# Patient Record
Sex: FEMALE | Race: WHITE | NOT HISPANIC OR LATINO | Employment: UNEMPLOYED | URBAN - METROPOLITAN AREA
[De-identification: names, ages, dates, MRNs, and addresses within clinical notes are randomized per-mention and may not be internally consistent; named-entity substitution may affect disease eponyms.]

---

## 2024-06-01 ENCOUNTER — HOSPITAL ENCOUNTER (EMERGENCY)
Facility: HOSPITAL | Age: 34
Discharge: HOME | End: 2024-06-01
Payer: COMMERCIAL

## 2024-06-01 ENCOUNTER — APPOINTMENT (OUTPATIENT)
Dept: RADIOLOGY | Facility: HOSPITAL | Age: 34
End: 2024-06-01
Payer: COMMERCIAL

## 2024-06-01 VITALS
BODY MASS INDEX: 36.88 KG/M2 | OXYGEN SATURATION: 100 % | RESPIRATION RATE: 20 BRPM | DIASTOLIC BLOOD PRESSURE: 81 MMHG | HEIGHT: 67 IN | TEMPERATURE: 97.3 F | SYSTOLIC BLOOD PRESSURE: 162 MMHG | WEIGHT: 235 LBS | HEART RATE: 76 BPM

## 2024-06-01 DIAGNOSIS — S68.119A FINGERTIP AMPUTATION, INITIAL ENCOUNTER: Primary | ICD-10-CM

## 2024-06-01 PROCEDURE — 73140 X-RAY EXAM OF FINGER(S): CPT | Mod: LEFT SIDE | Performed by: RADIOLOGY

## 2024-06-01 PROCEDURE — 90471 IMMUNIZATION ADMIN: CPT

## 2024-06-01 PROCEDURE — 99284 EMERGENCY DEPT VISIT MOD MDM: CPT | Mod: 25

## 2024-06-01 PROCEDURE — 73140 X-RAY EXAM OF FINGER(S): CPT | Mod: LT

## 2024-06-01 PROCEDURE — 2500000004 HC RX 250 GENERAL PHARMACY W/ HCPCS (ALT 636 FOR OP/ED): Mod: JZ

## 2024-06-01 PROCEDURE — 90715 TDAP VACCINE 7 YRS/> IM: CPT

## 2024-06-01 PROCEDURE — 96365 THER/PROPH/DIAG IV INF INIT: CPT

## 2024-06-01 PROCEDURE — 2500000005 HC RX 250 GENERAL PHARMACY W/O HCPCS

## 2024-06-01 RX ORDER — CEPHALEXIN 500 MG/1
500 CAPSULE ORAL 4 TIMES DAILY
Qty: 40 CAPSULE | Refills: 0 | Status: SHIPPED | OUTPATIENT
Start: 2024-06-01 | End: 2024-06-11

## 2024-06-01 RX ORDER — LIDOCAINE HYDROCHLORIDE 10 MG/ML
5 INJECTION INFILTRATION; PERINEURAL ONCE
Status: COMPLETED | OUTPATIENT
Start: 2024-06-01 | End: 2024-06-01

## 2024-06-01 RX ORDER — CEFAZOLIN SODIUM 2 G/100ML
2 INJECTION, SOLUTION INTRAVENOUS ONCE
Status: COMPLETED | OUTPATIENT
Start: 2024-06-01 | End: 2024-06-01

## 2024-06-01 RX ADMIN — LIDOCAINE HYDROCHLORIDE 50 MG: 10 INJECTION, SOLUTION INFILTRATION; PERINEURAL at 12:25

## 2024-06-01 RX ADMIN — CEFAZOLIN SODIUM 2 G: 2 INJECTION, SOLUTION INTRAVENOUS at 12:25

## 2024-06-01 RX ADMIN — TETANUS TOXOID, REDUCED DIPHTHERIA TOXOID AND ACELLULAR PERTUSSIS VACCINE, ADSORBED 0.5 ML: 5; 2.5; 8; 8; 2.5 SUSPENSION INTRAMUSCULAR at 14:35

## 2024-06-01 ASSESSMENT — PAIN - FUNCTIONAL ASSESSMENT: PAIN_FUNCTIONAL_ASSESSMENT: 0-10

## 2024-06-01 ASSESSMENT — COLUMBIA-SUICIDE SEVERITY RATING SCALE - C-SSRS
6. HAVE YOU EVER DONE ANYTHING, STARTED TO DO ANYTHING, OR PREPARED TO DO ANYTHING TO END YOUR LIFE?: NO
2. HAVE YOU ACTUALLY HAD ANY THOUGHTS OF KILLING YOURSELF?: NO
1. IN THE PAST MONTH, HAVE YOU WISHED YOU WERE DEAD OR WISHED YOU COULD GO TO SLEEP AND NOT WAKE UP?: NO

## 2024-06-01 ASSESSMENT — PAIN DESCRIPTION - ORIENTATION: ORIENTATION: LEFT

## 2024-06-01 ASSESSMENT — PAIN DESCRIPTION - DESCRIPTORS: DESCRIPTORS: SHARP

## 2024-06-01 ASSESSMENT — PAIN DESCRIPTION - PAIN TYPE: TYPE: ACUTE PAIN

## 2024-06-01 ASSESSMENT — PAIN SCALES - GENERAL: PAINLEVEL_OUTOF10: 8

## 2024-06-01 ASSESSMENT — PAIN DESCRIPTION - LOCATION: LOCATION: FINGER (COMMENT WHICH ONE)

## 2024-06-01 NOTE — ED PROVIDER NOTES
Chief Complaint   Patient presents with    Finger Laceration     Pt cut the tip of her left ring finger off while trying to close a sliding glass door.        34-year-old female arrives to the emergency department the chief complaint of left fourth finger injury.  The patient states that she had a stuck patio sliding door that she was very frustrated with, the patient states that with all her mites she slid the door closed closing it rapidly, unbeknownst to her her fourth finger of the left hand was inside the door jam which was crushed by the sliding door.  The patient states that the tip of the finger was cut completely off and what was left of the tip of the finger was smashed and not recognizable.  The patient is not able to control the bleeding out of the end of the finger, and called EMS.  The patient is a diabetic, however is not on any blood thinners.  Patient's tetanus shot is not up-to-date.  Patient endorses an 8 out of 10 pain worse with any movement or palpation of the finger.      History provided by:  Patient   used: No         PmHx, PsHx, Allergies, Family Hx, social Hx reviewed as documented    Given the focused nature of the complaint, only related components review of systems were evaluated, and abnormalities indicated in HPI    Physical Exam:    General: Patient is AAOx3, appears well developed, well nourished, is a good historian, answers questions appropriately    HEENT: head normocephalic, atraumatic, PERRLA, EOMs intact, oropharynx without erythema or exudate, buccal mucosa intact without lesions, TMs unremarkable, nose is patent bilateral    Pulmonary: CTAB, no accessory muscle use, able to speak full clear sentences    Cardiac: HRRR, no murmurs, rubs or gallops    GI: soft, non-tender, non-distended    Musculoskeletal: full weight bearing, MÁRQUEZ, no joint effusions, clubbing or edema noted    Skin: The pad of fourth finger of the left hand below the nail is amputated  from the distal phalanx on, with no evidence of the distal phalanx protruding out of the skin.  There is steady bleeding appreciated with no arterial spray, there is no flap of skin, the partial amputation is clean and transverse completely through the end of the finger with no flap.  Medical Decision Making  This patient was seen in the emergency department with an attending physician available at all times throughout their ED course    Primary consideration for this patient would be bleeding control, secondary would be infection control.  Patient is crying uncontrollably upon arrival, a block was instilled by me for the fourth finger of the left hand.    The base of the finger was prepped with chlorhexidine, 3 injections were made, one at the base of the finger on the palmar aspect midline to the finger, 1.5 mL of 1% lidocaine was instilled, then 1 each injection was made on the base of the finger both lateral and medial aspects, with approximately 1 to 1.5 mL of 1% lidocaine instilled.  Patient tolerated injections well, patient was left for 10 minutes for block to take hold.    After the block, the patient tolerated movement and palpitation well without difficulty, x-rays of the finger revealed an avulsion fracture tuft of the distal phalanx just proximal to the partial amputation.  Dr. Soliz with orthopedic was consulted, he recommended that if there is a flap of skin for this to be closed, however given there is no flap of skin, Surgicel will be used for bleeding control and patient will follow with Dr. Urias as ordered.    Primary nursing staff after cleansing wound and irrigating with normal saline, applied Surgifoam directly to wound covered with nonadherent dressing, DSD, and rolled in a bulky dressing, patient watched for approximately 30 minutes to ensure that the dressing did not bleed through, which it did not.  Patient requesting discharge at this time.  Patient's tetanus shot updated while in the  emergency department.  Patient received 2 g of IV Ancef and was discharged on Keflex 500 mg 4 times a day for 10 days.  Patient will continue to take over-the-counter analgesics as needed for discomfort and follow-up with Dr. Urias    Patient is amenable to the plan of discharge as outlined above, all patient's questions pertaining to their ED course were answered in their entirety.  Strict return precautions were discussed with the patient and they verbalized understanding.  Further, it was made clear to the patient that from an emergent basis, all effort and testing was done to eliminate any imminent dangerous or potentially dangerous conditions of the patient however if their symptoms get much worse or feel life-threatening, they are to return to the emergency department or call 911 immediately.    Amount and/or Complexity of Data Reviewed  Radiology: ordered. Decision-making details documented in ED Course.       Diagnoses as of 24 1723   Fingertip amputation, initial encounter       The patient has had the following imaging during this ER visit: XR FINGERS LEFT 2+ VIEWS     Patient History   Past Medical History:   Diagnosis Date    Personal history of other diseases of the circulatory system     History of hypertension    Personal history of other endocrine, nutritional and metabolic disease     History of type 2 diabetes mellitus     Past Surgical History:   Procedure Laterality Date    OTHER SURGICAL HISTORY  2020     section     No family history on file.  Social History     Tobacco Use    Smoking status: Not on file    Smokeless tobacco: Not on file   Substance Use Topics    Alcohol use: Not on file    Drug use: Not on file       ED Triage Vitals [24 1109]   Temperature Heart Rate Respirations BP   36.3 °C (97.3 °F) 76 20 162/81      Pulse Ox Temp Source Heart Rate Source Patient Position   100 % Temporal Monitor Sitting      BP Location FiO2 (%)     Right arm --       Vitals:     "06/01/24 1109   BP: 162/81   BP Location: Right arm   Patient Position: Sitting   Pulse: 76   Resp: 20   Temp: 36.3 °C (97.3 °F)   TempSrc: Temporal   SpO2: 100%   Weight: 107 kg (235 lb)   Height: 1.702 m (5' 7\")               JENSEN Kan-CNP  06/01/24 1723    "

## 2024-06-05 ENCOUNTER — OFFICE VISIT (OUTPATIENT)
Dept: ORTHOPEDIC SURGERY | Facility: CLINIC | Age: 34
End: 2024-06-05
Payer: COMMERCIAL

## 2024-06-05 VITALS — BODY MASS INDEX: 36.88 KG/M2 | WEIGHT: 235 LBS | HEIGHT: 67 IN

## 2024-06-05 DIAGNOSIS — S68.119A FINGERTIP AMPUTATION, INITIAL ENCOUNTER: Primary | ICD-10-CM

## 2024-06-05 PROCEDURE — 99204 OFFICE O/P NEW MOD 45 MIN: CPT | Performed by: ORTHOPAEDIC SURGERY

## 2024-06-05 ASSESSMENT — PAIN - FUNCTIONAL ASSESSMENT: PAIN_FUNCTIONAL_ASSESSMENT: 0-10

## 2024-06-05 ASSESSMENT — PAIN SCALES - GENERAL: PAINLEVEL_OUTOF10: 4

## 2024-06-05 NOTE — PROGRESS NOTES
NPV/ Referred by ER for a left ring finger tip amputation while closing a sliding glass door on 06/01/2024.  She is on an antibiotic.

## 2024-06-26 ENCOUNTER — APPOINTMENT (OUTPATIENT)
Dept: ORTHOPEDIC SURGERY | Facility: CLINIC | Age: 34
End: 2024-06-26
Payer: COMMERCIAL